# Patient Record
Sex: FEMALE | URBAN - METROPOLITAN AREA
[De-identification: names, ages, dates, MRNs, and addresses within clinical notes are randomized per-mention and may not be internally consistent; named-entity substitution may affect disease eponyms.]

---

## 2017-08-31 ENCOUNTER — FOLLOW UP ESTABLISHED (OUTPATIENT)
Dept: URBAN - METROPOLITAN AREA CLINIC 10 | Facility: CLINIC | Age: 42
End: 2017-08-31
Payer: COMMERCIAL

## 2017-08-31 DIAGNOSIS — E11.9 TYPE 2 DIABETES MELLITUS WITHOUT COMPLICATIONS: Primary | ICD-10-CM

## 2017-08-31 DIAGNOSIS — H52.4 PRESBYOPIA: ICD-10-CM

## 2017-08-31 DIAGNOSIS — Z79.84 LONG TERM (CURRENT) USE OF ORAL ANTIDIABETIC DRUGS: ICD-10-CM

## 2017-08-31 PROCEDURE — 92250 FUNDUS PHOTOGRAPHY W/I&R: CPT | Performed by: OPTOMETRIST

## 2017-08-31 PROCEDURE — 92014 COMPRE OPH EXAM EST PT 1/>: CPT | Performed by: OPTOMETRIST

## 2017-08-31 ASSESSMENT — INTRAOCULAR PRESSURE
OS: 19
OD: 19

## 2017-08-31 ASSESSMENT — KERATOMETRY: OD: 45.13

## 2017-08-31 ASSESSMENT — VISUAL ACUITY
OS: 20/25
OD: 20/25

## 2017-09-12 ENCOUNTER — FOLLOW UP ESTABLISHED (OUTPATIENT)
Dept: URBAN - METROPOLITAN AREA CLINIC 30 | Facility: CLINIC | Age: 42
End: 2017-09-12
Payer: COMMERCIAL

## 2017-09-12 DIAGNOSIS — H04.123 DRY EYE SYNDROME OF BILATERAL LACRIMAL GLANDS: Primary | ICD-10-CM

## 2017-09-12 PROCEDURE — 99213 OFFICE O/P EST LOW 20 MIN: CPT | Performed by: OPTOMETRIST

## 2017-09-12 PROCEDURE — 83861 MICROFLUID ANALY TEARS: CPT | Performed by: OPTOMETRIST

## 2017-09-12 RX ORDER — CARBOXYMETHYLCELLULOSE SODIUM 5 MG/ML
0.5 % SOLUTION/ DROPS OPHTHALMIC
Qty: 1 | Refills: 10 | Status: ACTIVE
Start: 2017-09-12

## 2017-09-12 RX ORDER — MINERAL OIL, PETROLATUM 425; 573 MG/G; MG/G
OINTMENT OPHTHALMIC
Qty: 1 | Refills: 10 | Status: ACTIVE
Start: 2017-09-12

## 2017-09-12 ASSESSMENT — INTRAOCULAR PRESSURE
OS: 22
OD: 20

## 2017-09-12 ASSESSMENT — VISUAL ACUITY
OS: 20/30
OD: 20/30

## 2017-11-28 ENCOUNTER — FOLLOW UP ESTABLISHED (OUTPATIENT)
Dept: URBAN - METROPOLITAN AREA CLINIC 30 | Facility: CLINIC | Age: 42
End: 2017-11-28
Payer: COMMERCIAL

## 2017-11-28 PROCEDURE — 92134 CPTRZ OPH DX IMG PST SGM RTA: CPT | Performed by: OPTOMETRIST

## 2017-11-28 PROCEDURE — 92012 INTRM OPH EXAM EST PATIENT: CPT | Performed by: OPTOMETRIST

## 2017-11-28 RX ORDER — PREDNISOLONE ACETATE 10 MG/ML
1 % SUSPENSION/ DROPS OPHTHALMIC
Qty: 15 | Refills: 0 | Status: ACTIVE
Start: 2017-11-28

## 2017-11-28 ASSESSMENT — VISUAL ACUITY
OD: 20/40
OS: 20/40

## 2018-12-21 ENCOUNTER — APPOINTMENT (OUTPATIENT)
Dept: URBAN - METROPOLITAN AREA CLINIC 282 | Age: 43
Setting detail: DERMATOLOGY
End: 2018-12-26

## 2018-12-21 DIAGNOSIS — B07.8 OTHER VIRAL WARTS: ICD-10-CM

## 2018-12-21 DIAGNOSIS — L83 ACANTHOSIS NIGRICANS: ICD-10-CM

## 2018-12-21 PROBLEM — D23.71 OTHER BENIGN NEOPLASM OF SKIN OF RIGHT LOWER LIMB, INCLUDING HIP: Status: ACTIVE | Noted: 2018-12-21

## 2018-12-21 PROBLEM — D23.72 OTHER BENIGN NEOPLASM OF SKIN OF LEFT LOWER LIMB, INCLUDING HIP: Status: ACTIVE | Noted: 2018-12-21

## 2018-12-21 PROCEDURE — OTHER PRESCRIPTION SAMPLES PROVIDED: OTHER

## 2018-12-21 PROCEDURE — OTHER COUNSELING: OTHER

## 2018-12-21 PROCEDURE — 99203 OFFICE O/P NEW LOW 30 MIN: CPT | Mod: 25

## 2018-12-21 PROCEDURE — OTHER LIQUID NITROGEN: OTHER

## 2018-12-21 PROCEDURE — 17110 DESTRUCT B9 LESION 1-14: CPT

## 2018-12-21 PROCEDURE — OTHER MIPS QUALITY: OTHER

## 2018-12-21 PROCEDURE — OTHER OTHER: OTHER

## 2018-12-21 ASSESSMENT — LOCATION ZONE DERM
LOCATION ZONE: FINGER
LOCATION ZONE: NECK

## 2018-12-21 ASSESSMENT — LOCATION DETAILED DESCRIPTION DERM
LOCATION DETAILED: RIGHT DISTAL RADIAL THUMB
LOCATION DETAILED: RIGHT INFERIOR LATERAL NECK
LOCATION DETAILED: LEFT INFERIOR ANTERIOR NECK

## 2018-12-21 ASSESSMENT — LOCATION SIMPLE DESCRIPTION DERM
LOCATION SIMPLE: LEFT ANTERIOR NECK
LOCATION SIMPLE: RIGHT ANTERIOR NECK
LOCATION SIMPLE: RIGHT THUMB

## 2018-12-21 NOTE — PROCEDURE: LIQUID NITROGEN
Medical Necessity Information: It is in your best interest to select a reason for this procedure from the list below. All of these items fulfill various CMS LCD requirements except the new and changing color options.
Number Of Freeze-Thaw Cycles: 2 freeze-thaw cycles
Duration Of Freeze Thaw-Cycle (Seconds): 5-10
Detail Level: Simple
Post-Care Instructions: I reviewed with the patient in detail post-care instructions. Patient is to wear sunprotection, and avoid picking at any of the treated lesions. Pt may apply Vaseline to crusted or scabbing areas.
Consent: The patient's consent was obtained including but not limited to risks of crusting, scabbing, blistering, scarring, darker or lighter pigmentary change, recurrence, incomplete removal and infection.
Add 52 Modifier (Optional): no
Medical Necessity Clause: This procedure was medically necessary because the lesions that were treated were: contagious and irritating to patient
Render Post-Care Instructions In Note?: yes

## 2018-12-21 NOTE — PROCEDURE: OTHER
Note Text (......Xxx Chief Complaint.): This diagnosis correlates with the
Detail Level: Zone
Other (Free Text): Patient is borderline diabetic.

## 2019-01-11 ENCOUNTER — APPOINTMENT (OUTPATIENT)
Dept: URBAN - METROPOLITAN AREA CLINIC 282 | Age: 44
Setting detail: DERMATOLOGY
End: 2019-01-16

## 2019-01-11 DIAGNOSIS — L30.4 ERYTHEMA INTERTRIGO: ICD-10-CM

## 2019-01-11 DIAGNOSIS — K64.4 RESIDUAL HEMORRHOIDAL SKIN TAGS: ICD-10-CM

## 2019-01-11 PROCEDURE — OTHER OTHER: OTHER

## 2019-01-11 PROCEDURE — OTHER COUNSELING: OTHER

## 2019-01-11 PROCEDURE — 99213 OFFICE O/P EST LOW 20 MIN: CPT

## 2019-01-11 PROCEDURE — OTHER PRESCRIPTION: OTHER

## 2019-01-11 RX ORDER — KETOCONAZOLE 20 MG/G
CREAM TOPICAL
Qty: 1 | Refills: 1 | Status: ERX | COMMUNITY
Start: 2019-01-11

## 2019-01-11 RX ORDER — HYDROCORTISONE 25 MG/G
CREAM TOPICAL
Qty: 1 | Refills: 1 | Status: ERX | COMMUNITY
Start: 2019-01-11

## 2019-01-11 ASSESSMENT — LOCATION DETAILED DESCRIPTION DERM
LOCATION DETAILED: LEFT SUPRAPUBIC SKIN
LOCATION DETAILED: PERIANAL SKIN

## 2019-01-11 ASSESSMENT — LOCATION SIMPLE DESCRIPTION DERM
LOCATION SIMPLE: PERIANAL SKIN
LOCATION SIMPLE: GROIN

## 2019-01-11 ASSESSMENT — LOCATION ZONE DERM
LOCATION ZONE: ANUS
LOCATION ZONE: TRUNK

## 2019-01-11 NOTE — PROCEDURE: OTHER
Detail Level: Zone
Note Text (......Xxx Chief Complaint.): This diagnosis correlates with the
Other (Free Text): Patient reports bleeding and pain. I reviewed conservative treatment management with her again today including stool softness, sitz baths and her Rx hemorrhoid cream PRN. The patient reports worsening symptoms and I recommended a possible surgical consultation with general or vascular surgery to see if there is another option for treatment

## 2019-02-15 ENCOUNTER — APPOINTMENT (OUTPATIENT)
Dept: URBAN - METROPOLITAN AREA CLINIC 282 | Age: 44
Setting detail: DERMATOLOGY
End: 2019-02-19

## 2019-02-15 DIAGNOSIS — L30.4 ERYTHEMA INTERTRIGO: ICD-10-CM

## 2019-02-15 PROCEDURE — 99213 OFFICE O/P EST LOW 20 MIN: CPT

## 2019-02-15 PROCEDURE — OTHER PRESCRIPTION: OTHER

## 2019-02-15 PROCEDURE — OTHER COUNSELING: OTHER

## 2019-02-15 RX ORDER — SALICYLIC ACID 6 MG/100ML
LOTION TOPICAL
Qty: 1 | Refills: 2 | Status: ERX | COMMUNITY
Start: 2019-02-15

## 2019-02-15 ASSESSMENT — LOCATION SIMPLE DESCRIPTION DERM: LOCATION SIMPLE: CHEST

## 2019-02-15 ASSESSMENT — LOCATION DETAILED DESCRIPTION DERM: LOCATION DETAILED: LOWER STERNUM

## 2019-02-15 ASSESSMENT — LOCATION ZONE DERM: LOCATION ZONE: TRUNK

## 2019-03-15 ENCOUNTER — APPOINTMENT (OUTPATIENT)
Dept: URBAN - METROPOLITAN AREA CLINIC 282 | Age: 44
Setting detail: DERMATOLOGY
End: 2019-03-19

## 2019-03-15 DIAGNOSIS — L30.4 ERYTHEMA INTERTRIGO: ICD-10-CM

## 2019-03-15 DIAGNOSIS — L24 IRRITANT CONTACT DERMATITIS: ICD-10-CM

## 2019-03-15 PROBLEM — L24.9 IRRITANT CONTACT DERMATITIS, UNSPECIFIED CAUSE: Status: ACTIVE | Noted: 2019-03-15

## 2019-03-15 PROCEDURE — OTHER TREATMENT REGIMEN: OTHER

## 2019-03-15 PROCEDURE — 99213 OFFICE O/P EST LOW 20 MIN: CPT

## 2019-03-15 PROCEDURE — OTHER COUNSELING: OTHER

## 2019-03-15 PROCEDURE — OTHER PRESCRIPTION: OTHER

## 2019-03-15 RX ORDER — HYDROCORTISONE 25 MG/G
CREAM TOPICAL
Qty: 1 | Refills: 2 | Status: ERX

## 2019-03-15 RX ORDER — CETIRIZINE HCL/PSEUDOEPHEDRINE 5 MG-120MG
TABLET, EXTENDED RELEASE 12 HR ORAL
Qty: 60 | Refills: 2 | Status: ERX | COMMUNITY
Start: 2019-03-15

## 2019-03-15 ASSESSMENT — LOCATION SIMPLE DESCRIPTION DERM
LOCATION SIMPLE: CHEST
LOCATION SIMPLE: RIGHT FOREARM
LOCATION SIMPLE: RIGHT BUTTOCK

## 2019-03-15 ASSESSMENT — LOCATION ZONE DERM
LOCATION ZONE: ARM
LOCATION ZONE: TRUNK

## 2019-03-15 ASSESSMENT — LOCATION DETAILED DESCRIPTION DERM
LOCATION DETAILED: RIGHT BUTTOCK
LOCATION DETAILED: LOWER STERNUM
LOCATION DETAILED: RIGHT DISTAL DORSAL FOREARM

## 2019-03-15 NOTE — PROCEDURE: TREATMENT REGIMEN
Detail Level: Zone
Initiate Treatment: -Hydrocortisone 2.5 % topical cream: Apply to affected areas twice a day for the next two weeks and then apply TIW\\n-Zyrtec 10 mg tablet: Take 1 pill PO QAM
Continue Regimen: Salicylic acid 6 % lotion: Apply to affected areas BID

## 2025-07-18 ENCOUNTER — OFFICE VISIT (OUTPATIENT)
Dept: URBAN - METROPOLITAN AREA CLINIC 30 | Facility: CLINIC | Age: 50
End: 2025-07-18
Payer: COMMERCIAL

## 2025-07-18 DIAGNOSIS — E11.9 TYPE 2 DIABETES MELLITUS WITHOUT COMPLICATIONS: ICD-10-CM

## 2025-07-18 DIAGNOSIS — Z79.84 LONG TERM (CURRENT) USE OF ORAL ANTIDIABETIC DRUGS: ICD-10-CM

## 2025-07-18 DIAGNOSIS — H04.123 DRY EYE SYNDROME OF BILATERAL LACRIMAL GLANDS: ICD-10-CM

## 2025-07-18 DIAGNOSIS — Z86.73 OLD CVA: ICD-10-CM

## 2025-07-18 DIAGNOSIS — H47.619 CORTICAL BLINDNESS, UNSPECIFIED SIDE OF BRAIN: Primary | ICD-10-CM

## 2025-07-18 PROCEDURE — 92134 CPTRZ OPH DX IMG PST SGM RTA: CPT | Performed by: OPTOMETRIST

## 2025-07-18 PROCEDURE — 92004 COMPRE OPH EXAM NEW PT 1/>: CPT | Performed by: OPTOMETRIST

## 2025-07-18 ASSESSMENT — VISUAL ACUITY
OD: HM
OS: 20/100

## 2025-07-18 ASSESSMENT — INTRAOCULAR PRESSURE
OS: 22
OD: 22

## 2025-08-01 ENCOUNTER — OFFICE VISIT (OUTPATIENT)
Dept: URBAN - METROPOLITAN AREA CLINIC 21 | Facility: CLINIC | Age: 50
End: 2025-08-01
Payer: COMMERCIAL

## 2025-08-01 DIAGNOSIS — H57.12 OCULAR PAIN, LEFT EYE: Primary | ICD-10-CM

## 2025-08-01 PROCEDURE — 92012 INTRM OPH EXAM EST PATIENT: CPT

## 2025-08-01 RX ORDER — AMOXICILLIN AND CLAVULANATE POTASSIUM 500; 125 MG/1; 1/1
TABLET, FILM COATED ORAL
Qty: 14 | Refills: 0 | Status: ACTIVE
Start: 2025-08-01

## 2025-08-01 ASSESSMENT — INTRAOCULAR PRESSURE
OD: 20
OS: 26

## 2025-08-04 ENCOUNTER — OFFICE VISIT (OUTPATIENT)
Dept: URBAN - METROPOLITAN AREA CLINIC 30 | Facility: CLINIC | Age: 50
End: 2025-08-04
Payer: COMMERCIAL

## 2025-08-04 DIAGNOSIS — H57.12 OCULAR PAIN, LEFT EYE: Primary | ICD-10-CM

## 2025-08-04 PROCEDURE — 92012 INTRM OPH EXAM EST PATIENT: CPT | Performed by: OPTOMETRIST

## 2025-08-04 RX ORDER — NEOMYCIN SULFATE, POLYMYXIN B SULFATE AND DEXAMETHASONE 1; 3.5; 1 MG/G; MG/G; [USP'U]/G
OINTMENT OPHTHALMIC
Qty: 3.5 | Refills: 1 | Status: ACTIVE
Start: 2025-08-04

## 2025-08-04 ASSESSMENT — INTRAOCULAR PRESSURE
OS: 23
OD: 26